# Patient Record
Sex: FEMALE | Race: WHITE
[De-identification: names, ages, dates, MRNs, and addresses within clinical notes are randomized per-mention and may not be internally consistent; named-entity substitution may affect disease eponyms.]

---

## 2016-05-06 VITALS
SYSTOLIC BLOOD PRESSURE: 165 MMHG | DIASTOLIC BLOOD PRESSURE: 72 MMHG | DIASTOLIC BLOOD PRESSURE: 72 MMHG | SYSTOLIC BLOOD PRESSURE: 165 MMHG | SYSTOLIC BLOOD PRESSURE: 165 MMHG | DIASTOLIC BLOOD PRESSURE: 72 MMHG | DIASTOLIC BLOOD PRESSURE: 72 MMHG | SYSTOLIC BLOOD PRESSURE: 165 MMHG

## 2017-07-28 ENCOUNTER — HOSPITAL ENCOUNTER (OUTPATIENT)
Dept: HOSPITAL 19 - ZLAB.WCH | Age: 67
End: 2017-07-28

## 2017-07-28 ENCOUNTER — HOSPITAL ENCOUNTER (OUTPATIENT)
Dept: HOSPITAL 6 - LAB | Age: 67
End: 2017-07-28
Payer: MEDICARE

## 2017-07-28 DIAGNOSIS — Z01.89: Primary | ICD-10-CM

## 2017-07-28 DIAGNOSIS — M25.512: Primary | ICD-10-CM

## 2017-09-28 ENCOUNTER — HOSPITAL ENCOUNTER (OUTPATIENT)
Dept: HOSPITAL 6 - MAMMO | Age: 67
End: 2017-09-28
Attending: FAMILY MEDICINE
Payer: MEDICARE

## 2017-09-28 DIAGNOSIS — Z12.31: Primary | ICD-10-CM

## 2017-09-28 PROCEDURE — G0202 SCR MAMMO BI INCL CAD: HCPCS

## 2017-10-23 ENCOUNTER — HOSPITAL ENCOUNTER (OUTPATIENT)
Dept: HOSPITAL 6 - MSO | Age: 67
End: 2017-10-23
Payer: MEDICARE

## 2017-10-23 DIAGNOSIS — R00.2: ICD-10-CM

## 2017-10-23 DIAGNOSIS — K44.9: ICD-10-CM

## 2017-10-23 DIAGNOSIS — K29.70: ICD-10-CM

## 2017-10-23 DIAGNOSIS — K21.9: ICD-10-CM

## 2017-10-23 DIAGNOSIS — I10: ICD-10-CM

## 2017-10-23 DIAGNOSIS — F41.9: ICD-10-CM

## 2017-10-23 DIAGNOSIS — K22.2: Primary | ICD-10-CM

## 2017-10-23 DIAGNOSIS — E05.00: ICD-10-CM

## 2017-10-23 DIAGNOSIS — F32.9: ICD-10-CM

## 2017-12-29 ENCOUNTER — HOSPITAL ENCOUNTER (OUTPATIENT)
Dept: HOSPITAL 6 - PT | Age: 67
Discharge: HOME | End: 2017-12-29
Attending: FAMILY MEDICINE
Payer: MEDICARE

## 2017-12-29 DIAGNOSIS — M25.511: Primary | ICD-10-CM

## 2018-06-26 ENCOUNTER — HOSPITAL ENCOUNTER (EMERGENCY)
Dept: HOSPITAL 6 - ED | Age: 68
Discharge: ANOTHER HOSPITAL | End: 2018-06-26
Payer: MEDICARE

## 2018-06-26 ENCOUNTER — HOSPITAL ENCOUNTER (INPATIENT)
Dept: HOSPITAL 19 - IMCU | Age: 68
LOS: 2 days | Discharge: HOME | DRG: 305 | End: 2018-06-28
Attending: INTERNAL MEDICINE | Admitting: INTERNAL MEDICINE
Payer: MEDICARE

## 2018-06-26 VITALS
SYSTOLIC BLOOD PRESSURE: 149 MMHG | SYSTOLIC BLOOD PRESSURE: 149 MMHG | SYSTOLIC BLOOD PRESSURE: 149 MMHG | SYSTOLIC BLOOD PRESSURE: 149 MMHG | DIASTOLIC BLOOD PRESSURE: 66 MMHG | DIASTOLIC BLOOD PRESSURE: 66 MMHG | DIASTOLIC BLOOD PRESSURE: 66 MMHG | DIASTOLIC BLOOD PRESSURE: 66 MMHG | SYSTOLIC BLOOD PRESSURE: 149 MMHG | DIASTOLIC BLOOD PRESSURE: 66 MMHG

## 2018-06-26 VITALS — OXYGEN SATURATION: 97 %

## 2018-06-26 VITALS — OXYGEN SATURATION: 99 %

## 2018-06-26 VITALS — OXYGEN SATURATION: 96 %

## 2018-06-26 VITALS — OXYGEN SATURATION: 93 %

## 2018-06-26 VITALS — OXYGEN SATURATION: 95 %

## 2018-06-26 VITALS — OXYGEN SATURATION: 98 %

## 2018-06-26 VITALS — OXYGEN SATURATION: 94 %

## 2018-06-26 VITALS — OXYGEN SATURATION: 100 %

## 2018-06-26 VITALS
DIASTOLIC BLOOD PRESSURE: 72 MMHG | TEMPERATURE: 97.1 F | HEART RATE: 71 BPM | OXYGEN SATURATION: 98 % | SYSTOLIC BLOOD PRESSURE: 159 MMHG

## 2018-06-26 VITALS — HEIGHT: 55 IN | WEIGHT: 178.35 LBS

## 2018-06-26 VITALS — OXYGEN SATURATION: 92 %

## 2018-06-26 VITALS — HEIGHT: 65.98 IN | BODY MASS INDEX: 26.68 KG/M2 | WEIGHT: 166.01 LBS

## 2018-06-26 VITALS
SYSTOLIC BLOOD PRESSURE: 171 MMHG | HEART RATE: 72 BPM | OXYGEN SATURATION: 95 % | TEMPERATURE: 96.8 F | DIASTOLIC BLOOD PRESSURE: 75 MMHG

## 2018-06-26 VITALS — OXYGEN SATURATION: 91 %

## 2018-06-26 VITALS — OXYGEN SATURATION: 90 %

## 2018-06-26 VITALS — OXYGEN SATURATION: 89 %

## 2018-06-26 DIAGNOSIS — R73.03: ICD-10-CM

## 2018-06-26 DIAGNOSIS — H81.93: ICD-10-CM

## 2018-06-26 DIAGNOSIS — I16.0: Primary | ICD-10-CM

## 2018-06-26 DIAGNOSIS — J42: ICD-10-CM

## 2018-06-26 DIAGNOSIS — R42: ICD-10-CM

## 2018-06-26 DIAGNOSIS — E87.6: ICD-10-CM

## 2018-06-26 DIAGNOSIS — I16.1: Primary | ICD-10-CM

## 2018-06-26 DIAGNOSIS — E78.5: ICD-10-CM

## 2018-06-26 LAB
ALBUMIN SERPL-MCNC: 4 G/DL (ref 3.5–5)
ALT SERPL-CCNC: 22 U/L (ref 9–52)
APPEARANCE UR: CLEAR
APTT PPP: 23.8 SECONDS (ref 21–32)
AST SERPL-CCNC: 36 U/L (ref 14–36)
BASOPHILS # BLD: 0 10*3/UL (ref 0.02–0.1)
BILIRUB SERPL-MCNC: 0.3 MG/DL (ref 0.2–1.3)
BILIRUB UR QL STRIP.AUTO: NEGATIVE
BUN/CREAT SERPL: 13.9 (ref 6–26)
CALCIUM SERPL-MCNC: 8.6 MG/DL (ref 8.4–10.2)
CO2 SERPL-SCNC: 27 MMOL/L (ref 22–30)
COLOR UR AUTO: YELLOW
EOSINOPHIL # BLD: 0.3 10*3/UL (ref 0.04–0.4)
EOSINOPHIL NFR BLD: 2.3 % (ref 1–5)
ERYTHROCYTE [DISTWIDTH] IN BLOOD BY AUTOMATED COUNT: 15.5 % (ref 11.5–14.5)
GLUCOSE SERPL-MCNC: 164 MG/DL (ref 65–105)
GLUCOSE UR QL STRIP.AUTO: (no result) MG/DL
HCT VFR BLD AUTO: 38.5 % (ref 37–47)
HGB BLD-MCNC: 11.8 G/DL (ref 12.5–16)
KETONES UR QL STRIP.AUTO: NEGATIVE
LEUKOCYTE ESTERASE UR QL STRIP: NEGATIVE
LYMPHOCYTES # BLD: 2.2 10*3/UL (ref 1.5–4)
MCH RBC QN AUTO: 26 PG (ref 27–31)
MCHC RBC AUTO-ENTMCNC: 31 G/DL (ref 33–37)
MCV RBC AUTO: 86 FL (ref 78–100)
MONOCYTES # BLD: 1.2 10*3/UL (ref 0.2–0.8)
NEUTROPHILS # BLD: 7.8 10*3/UL (ref 1.4–6.5)
NITRITE UR QL STRIP: NEGATIVE
PH UR STRIP.AUTO: 8 [PH] (ref 5–8)
PLATELET # BLD AUTO: 273 K/MM3 (ref 130–400)
PMV BLD AUTO: 10.5 FL (ref 7.4–10.4)
POTASSIUM SERPL-SCNC: 3.1 MMOL/L (ref 3.6–5)
PROT ?TM UR-MCNC: (no result) MG/DL
PROT SERPL-MCNC: 7.4 G/DL (ref 6.3–8.2)
PROTHROMBIN TIME: 10.3 SECONDS (ref 9–12)
RBC # BLD AUTO: 4.49 M/MM3 (ref 4.1–5.3)
RBC UR QL AUTO: NEGATIVE
SODIUM SERPL-SCNC: 139 MMOL/L (ref 137–145)
SP GR UR STRIP.AUTO: 1.01 (ref 1–1.03)
UROBILINOGEN UR-MCNC: NORMAL MG/DL
WBC # BLD AUTO: 11.5 K/MM3 (ref 4.8–10.8)
WBC #/AREA URNS HPF: (no result) /HPF (ref 0–3)

## 2018-06-26 PROCEDURE — A9585 GADOBUTROL INJECTION: HCPCS

## 2018-06-27 VITALS — OXYGEN SATURATION: 95 %

## 2018-06-27 VITALS — OXYGEN SATURATION: 96 %

## 2018-06-27 VITALS — OXYGEN SATURATION: 94 %

## 2018-06-27 VITALS
HEART RATE: 76 BPM | SYSTOLIC BLOOD PRESSURE: 165 MMHG | OXYGEN SATURATION: 94 % | DIASTOLIC BLOOD PRESSURE: 84 MMHG | TEMPERATURE: 98 F

## 2018-06-27 VITALS — OXYGEN SATURATION: 85 %

## 2018-06-27 VITALS — OXYGEN SATURATION: 93 %

## 2018-06-27 VITALS — OXYGEN SATURATION: 98 %

## 2018-06-27 VITALS — OXYGEN SATURATION: 97 %

## 2018-06-27 VITALS — OXYGEN SATURATION: 88 %

## 2018-06-27 VITALS — DIASTOLIC BLOOD PRESSURE: 85 MMHG | SYSTOLIC BLOOD PRESSURE: 136 MMHG | TEMPERATURE: 98 F | HEART RATE: 72 BPM

## 2018-06-27 VITALS — OXYGEN SATURATION: 90 %

## 2018-06-27 VITALS — OXYGEN SATURATION: 92 %

## 2018-06-27 VITALS — OXYGEN SATURATION: 99 %

## 2018-06-27 VITALS
DIASTOLIC BLOOD PRESSURE: 62 MMHG | OXYGEN SATURATION: 98 % | SYSTOLIC BLOOD PRESSURE: 144 MMHG | TEMPERATURE: 97 F | HEART RATE: 69 BPM

## 2018-06-27 VITALS — HEART RATE: 71 BPM | SYSTOLIC BLOOD PRESSURE: 166 MMHG | DIASTOLIC BLOOD PRESSURE: 75 MMHG

## 2018-06-27 VITALS — OXYGEN SATURATION: 100 %

## 2018-06-27 VITALS — TEMPERATURE: 97.2 F

## 2018-06-27 VITALS — SYSTOLIC BLOOD PRESSURE: 154 MMHG | DIASTOLIC BLOOD PRESSURE: 81 MMHG | TEMPERATURE: 97.5 F | HEART RATE: 60 BPM

## 2018-06-27 VITALS — SYSTOLIC BLOOD PRESSURE: 143 MMHG | HEART RATE: 66 BPM | DIASTOLIC BLOOD PRESSURE: 66 MMHG | OXYGEN SATURATION: 94 %

## 2018-06-27 VITALS — TEMPERATURE: 97 F | OXYGEN SATURATION: 97 %

## 2018-06-27 VITALS — OXYGEN SATURATION: 91 %

## 2018-06-27 VITALS — OXYGEN SATURATION: 87 %

## 2018-06-27 LAB
ANION GAP SERPL CALC-SCNC: 8 MMOL/L (ref 7–16)
BASOPHILS # BLD: 0 10*3/UL (ref 0–0.2)
BASOPHILS NFR BLD AUTO: 0.2 % (ref 0–2)
BUN SERPL-MCNC: 8 MG/DL (ref 7–17)
CALCIUM SERPL-MCNC: 8.8 MG/DL (ref 8.4–10.2)
CHLORIDE SERPL-SCNC: 101 MMOL/L (ref 98–107)
CO2 SERPL-SCNC: 30 MMOL/L (ref 22–30)
CREAT SERPL-SCNC: 0.76 MG/DL (ref 0.52–1.25)
EOSINOPHIL # BLD: 0.2 10*3/UL (ref 0–0.7)
EOSINOPHIL NFR BLD: 2.1 % (ref 0–4)
ERYTHROCYTE [DISTWIDTH] IN BLOOD BY AUTOMATED COUNT: 15.5 % (ref 11.5–14.5)
GLUCOSE SERPL-MCNC: 91 MG/DL (ref 74–106)
GRANULOCYTES # BLD AUTO: 56.9 % (ref 42.2–75.2)
HCT VFR BLD AUTO: 34.4 % (ref 37–47)
HGB BLD-MCNC: 10.7 G/DL (ref 12.5–16)
LYMPHOCYTES # BLD: 2.7 10*3/UL (ref 1.2–3.4)
LYMPHOCYTES NFR BLD: 30.7 % (ref 20–51)
MAGNESIUM SERPL-MCNC: 1.9 MG/DL (ref 1.6–2.3)
MCH RBC QN AUTO: 26 PG (ref 27–31)
MCHC RBC AUTO-ENTMCNC: 31 G/DL (ref 33–37)
MCV RBC AUTO: 85 FL (ref 80–100)
MONOCYTES # BLD: 0.9 10*3/UL (ref 0.1–0.6)
MONOCYTES NFR BLD AUTO: 9.8 % (ref 1.7–9.3)
NEUTROPHILS # BLD: 5 10*3/UL (ref 1.4–6.5)
PLATELET # BLD AUTO: 251 K/MM3 (ref 130–400)
PMV BLD AUTO: 10.4 FL (ref 7.4–10.4)
POTASSIUM SERPL-SCNC: 3.7 MMOL/L (ref 3.4–5)
RBC # BLD AUTO: 4.07 M/MM3 (ref 4.1–5.3)
SODIUM SERPL-SCNC: 139 MMOL/L (ref 137–145)
TSH SERPL DL<=0.005 MIU/L-ACNC: 1.47 UIU/ML (ref 0.47–4.68)

## 2018-06-28 VITALS — TEMPERATURE: 97.5 F | HEART RATE: 73 BPM | SYSTOLIC BLOOD PRESSURE: 151 MMHG | DIASTOLIC BLOOD PRESSURE: 67 MMHG

## 2018-06-28 VITALS — HEART RATE: 73 BPM | TEMPERATURE: 98.4 F | DIASTOLIC BLOOD PRESSURE: 61 MMHG | SYSTOLIC BLOOD PRESSURE: 114 MMHG

## 2018-06-28 VITALS — SYSTOLIC BLOOD PRESSURE: 179 MMHG | HEART RATE: 62 BPM | DIASTOLIC BLOOD PRESSURE: 77 MMHG | TEMPERATURE: 97.7 F

## 2018-06-28 VITALS — HEART RATE: 66 BPM | DIASTOLIC BLOOD PRESSURE: 75 MMHG | TEMPERATURE: 98 F | SYSTOLIC BLOOD PRESSURE: 144 MMHG

## 2018-06-28 LAB
ANION GAP SERPL CALC-SCNC: 13 MMOL/L (ref 7–16)
BUN SERPL-MCNC: 15 MG/DL (ref 7–17)
CALCIUM SERPL-MCNC: 9.2 MG/DL (ref 8.4–10.2)
CHLORIDE SERPL-SCNC: 98 MMOL/L (ref 98–107)
CO2 SERPL-SCNC: 28 MMOL/L (ref 22–30)
CREAT SERPL-SCNC: 0.88 MG/DL (ref 0.52–1.25)
ERYTHROCYTE [DISTWIDTH] IN BLOOD BY AUTOMATED COUNT: 15.9 % (ref 11.5–14.5)
GLUCOSE SERPL-MCNC: 99 MG/DL (ref 74–106)
HCT VFR BLD AUTO: 37.2 % (ref 37–47)
HGB BLD-MCNC: 11.5 G/DL (ref 12.5–16)
MCH RBC QN AUTO: 26 PG (ref 27–31)
MCHC RBC AUTO-ENTMCNC: 31 G/DL (ref 33–37)
MCV RBC AUTO: 85 FL (ref 80–100)
PLATELET # BLD AUTO: 271 K/MM3 (ref 130–400)
PMV BLD AUTO: 10.8 FL (ref 7.4–10.4)
POTASSIUM SERPL-SCNC: 3.3 MMOL/L (ref 3.4–5)
RBC # BLD AUTO: 4.4 M/MM3 (ref 4.1–5.3)
SODIUM SERPL-SCNC: 138 MMOL/L (ref 137–145)

## 2018-07-20 ENCOUNTER — HOSPITAL ENCOUNTER (OUTPATIENT)
Dept: HOSPITAL 6 - CARDREHAB | Age: 68
End: 2018-07-20
Attending: FAMILY MEDICINE
Payer: MEDICARE

## 2018-07-20 DIAGNOSIS — I49.3: Primary | ICD-10-CM

## 2018-07-20 DIAGNOSIS — Z82.49: ICD-10-CM

## 2018-07-20 PROCEDURE — A9500 TC99M SESTAMIBI: HCPCS

## 2018-08-10 ENCOUNTER — HOSPITAL ENCOUNTER (OUTPATIENT)
Dept: HOSPITAL 6 - PT | Age: 68
LOS: 52 days | Discharge: HOME | End: 2018-10-01
Attending: FAMILY MEDICINE
Payer: MEDICARE

## 2018-08-10 DIAGNOSIS — R42: Primary | ICD-10-CM

## 2018-10-02 ENCOUNTER — HOSPITAL ENCOUNTER (OUTPATIENT)
Dept: HOSPITAL 6 - MAMMO | Age: 68
End: 2018-10-02
Attending: FAMILY MEDICINE
Payer: MEDICARE

## 2018-10-02 DIAGNOSIS — Z12.31: Primary | ICD-10-CM

## 2019-02-12 ENCOUNTER — HOSPITAL ENCOUNTER (OUTPATIENT)
Dept: HOSPITAL 6 - RAD | Age: 69
End: 2019-02-12
Attending: FAMILY MEDICINE
Payer: MEDICARE

## 2019-02-12 DIAGNOSIS — M85.88: ICD-10-CM

## 2019-02-12 DIAGNOSIS — M85.851: ICD-10-CM

## 2019-02-12 DIAGNOSIS — M85.852: ICD-10-CM

## 2019-02-12 DIAGNOSIS — Z13.820: Primary | ICD-10-CM

## 2019-07-20 ENCOUNTER — HOSPITAL ENCOUNTER (EMERGENCY)
Dept: HOSPITAL 6 - ED | Age: 69
Discharge: HOME | End: 2019-07-20
Payer: MEDICARE

## 2019-07-20 VITALS — BODY MASS INDEX: 27.03 KG/M2 | HEIGHT: 67.99 IN | WEIGHT: 178.35 LBS

## 2019-07-20 VITALS
DIASTOLIC BLOOD PRESSURE: 65 MMHG | DIASTOLIC BLOOD PRESSURE: 65 MMHG | SYSTOLIC BLOOD PRESSURE: 134 MMHG | SYSTOLIC BLOOD PRESSURE: 134 MMHG | DIASTOLIC BLOOD PRESSURE: 65 MMHG | SYSTOLIC BLOOD PRESSURE: 134 MMHG | SYSTOLIC BLOOD PRESSURE: 134 MMHG | SYSTOLIC BLOOD PRESSURE: 134 MMHG | SYSTOLIC BLOOD PRESSURE: 134 MMHG | DIASTOLIC BLOOD PRESSURE: 65 MMHG | DIASTOLIC BLOOD PRESSURE: 65 MMHG | DIASTOLIC BLOOD PRESSURE: 65 MMHG | DIASTOLIC BLOOD PRESSURE: 65 MMHG | SYSTOLIC BLOOD PRESSURE: 134 MMHG

## 2019-07-20 DIAGNOSIS — E78.5: ICD-10-CM

## 2019-07-20 DIAGNOSIS — I10: ICD-10-CM

## 2019-07-20 DIAGNOSIS — E05.90: ICD-10-CM

## 2019-07-20 DIAGNOSIS — Z98.890: ICD-10-CM

## 2019-07-20 DIAGNOSIS — G45.9: Primary | ICD-10-CM

## 2019-07-20 LAB
ALBUMIN SERPL-MCNC: 4 G/DL (ref 3.4–4.8)
ALT SERPL-CCNC: 14 U/L (ref 0–55)
AST SERPL-CCNC: 20 U/L (ref 5–34)
BASOPHILS # BLD: 0 10*3/UL (ref 0.02–0.1)
BILIRUB SERPL-MCNC: 0.2 MG/DL (ref 0.2–1.2)
CALCIUM SERPL-MCNC: 9.3 MG/DL (ref 8.3–10.5)
CO2 SERPL-SCNC: 27 MMOL/L (ref 23–31)
EOSINOPHIL # BLD: 0.3 10*3/UL (ref 0.04–0.4)
EOSINOPHIL NFR BLD: 3.2 % (ref 1–5)
ERYTHROCYTE [DISTWIDTH] IN BLOOD BY AUTOMATED COUNT: 14.4 % (ref 11.5–14.5)
GLUCOSE SERPL-MCNC: 112 MG/DL (ref 65–105)
HCT VFR BLD AUTO: 38.3 % (ref 37–47)
HGB BLD-MCNC: 12 G/DL (ref 12.5–16)
LYMPHOCYTES # BLD: 2 10*3/UL (ref 1.5–4)
MCH RBC QN AUTO: 29 PG (ref 27–31)
MCHC RBC AUTO-ENTMCNC: 31 G/DL (ref 33–37)
MCV RBC AUTO: 91 FL (ref 78–100)
MONOCYTES # BLD: 0.9 10*3/UL (ref 0.2–0.8)
NEUTROPHILS # BLD: 5.9 10*3/UL (ref 1.4–6.5)
PLATELET # BLD AUTO: 235 K/MM3 (ref 130–400)
PMV BLD AUTO: 10.5 FL (ref 7.4–10.4)
POTASSIUM SERPL-SCNC: 4.5 MMOL/L (ref 3.5–5.1)
PROT SERPL-MCNC: 7 G/DL (ref 6.2–8.1)
RBC # BLD AUTO: 4.21 M/MM3 (ref 4.1–5.3)
SODIUM SERPL-SCNC: 140 MMOL/L (ref 136–145)
WBC # BLD AUTO: 9.1 K/MM3 (ref 4.8–10.8)

## 2019-08-08 ENCOUNTER — HOSPITAL ENCOUNTER (OUTPATIENT)
Dept: HOSPITAL 6 - RAD | Age: 69
End: 2019-08-08
Attending: FAMILY MEDICINE
Payer: MEDICARE

## 2019-08-08 DIAGNOSIS — I65.23: Primary | ICD-10-CM

## 2019-11-13 ENCOUNTER — HOSPITAL ENCOUNTER (OUTPATIENT)
Dept: HOSPITAL 6 - MAMMO | Age: 69
End: 2019-11-13
Attending: FAMILY MEDICINE
Payer: MEDICARE

## 2019-11-13 DIAGNOSIS — Z12.31: Primary | ICD-10-CM

## 2020-02-26 ENCOUNTER — HOSPITAL ENCOUNTER (OUTPATIENT)
Dept: HOSPITAL 6 - MSO | Age: 70
Discharge: HOME | End: 2020-02-26
Payer: MEDICARE

## 2020-02-26 ENCOUNTER — HOSPITAL ENCOUNTER (OUTPATIENT)
Dept: HOSPITAL 6 - LAB | Age: 70
End: 2020-02-26
Attending: FAMILY MEDICINE
Payer: MEDICARE

## 2020-02-26 DIAGNOSIS — Z79.899: ICD-10-CM

## 2020-02-26 DIAGNOSIS — E78.5: ICD-10-CM

## 2020-02-26 DIAGNOSIS — I10: ICD-10-CM

## 2020-02-26 DIAGNOSIS — E83.51: Primary | ICD-10-CM

## 2020-02-26 DIAGNOSIS — H25.812: Primary | ICD-10-CM

## 2020-02-26 DIAGNOSIS — Z86.73: ICD-10-CM

## 2020-02-26 DIAGNOSIS — Z79.02: ICD-10-CM

## 2020-02-26 DIAGNOSIS — D72.829: ICD-10-CM

## 2020-02-26 DIAGNOSIS — E05.90: ICD-10-CM

## 2020-02-26 DIAGNOSIS — E78.00: ICD-10-CM

## 2020-02-26 DIAGNOSIS — Z88.8: ICD-10-CM

## 2020-02-26 DIAGNOSIS — E05.00: ICD-10-CM

## 2020-02-26 LAB
ALBUMIN SERPL-MCNC: 4.3 G/DL (ref 3.4–4.8)
ALT SERPL-CCNC: 13 U/L (ref 0–55)
AST SERPL-CCNC: 19 U/L (ref 5–34)
BASOPHILS # BLD: 0 10*3/UL (ref 0.02–0.1)
BILIRUB SERPL-MCNC: 0.4 MG/DL (ref 0.2–1.2)
CALCIUM SERPL-MCNC: 9.4 MG/DL (ref 8.3–10.5)
CO2 SERPL-SCNC: 25 MMOL/L (ref 23–31)
EOSINOPHIL # BLD: 0.4 10*3/UL (ref 0.04–0.4)
EOSINOPHIL NFR BLD: 4.4 % (ref 1–5)
ERYTHROCYTE [DISTWIDTH] IN BLOOD BY AUTOMATED COUNT: 13.7 % (ref 11.5–14.5)
GLUCOSE SERPL-MCNC: 101 MG/DL (ref 65–105)
HCT VFR BLD AUTO: 40.5 % (ref 37–47)
HGB BLD-MCNC: 12.3 G/DL (ref 12.5–16)
LYMPHOCYTES # BLD: 2 10*3/UL (ref 1.5–4)
MCH RBC QN AUTO: 27 PG (ref 27–31)
MCHC RBC AUTO-ENTMCNC: 30 G/DL (ref 33–37)
MCV RBC AUTO: 89 FL (ref 78–100)
MONOCYTES # BLD: 0.8 10*3/UL (ref 0.2–0.8)
NEUTROPHILS # BLD: 4.7 10*3/UL (ref 1.4–6.5)
PLATELET # BLD AUTO: 244 K/MM3 (ref 130–400)
PMV BLD AUTO: 10.7 FL (ref 7.4–10.4)
POTASSIUM SERPL-SCNC: 3.5 MMOL/L (ref 3.5–5.1)
PROT SERPL-MCNC: 7.1 G/DL (ref 6.2–8.1)
RBC # BLD AUTO: 4.56 M/MM3 (ref 4.1–5.3)
SODIUM SERPL-SCNC: 140 MMOL/L (ref 136–145)
WBC # BLD AUTO: 7.9 K/MM3 (ref 4.8–10.8)

## 2020-02-26 PROCEDURE — V2632 POST CHMBR INTRAOCULAR LENS: HCPCS

## 2020-11-25 ENCOUNTER — HOSPITAL ENCOUNTER (OUTPATIENT)
Dept: HOSPITAL 6 - MAMMO | Age: 70
End: 2020-11-25
Attending: FAMILY MEDICINE
Payer: MEDICARE

## 2020-11-25 DIAGNOSIS — Z12.31: Primary | ICD-10-CM

## 2021-03-25 ENCOUNTER — HOSPITAL ENCOUNTER (OUTPATIENT)
Dept: HOSPITAL 6 - RAD | Age: 71
End: 2021-03-25
Attending: FAMILY MEDICINE
Payer: MEDICARE

## 2021-03-25 DIAGNOSIS — Z78.0: ICD-10-CM

## 2021-03-25 DIAGNOSIS — Z13.820: Primary | ICD-10-CM

## 2021-03-25 DIAGNOSIS — M85.80: ICD-10-CM

## 2021-12-08 ENCOUNTER — HOSPITAL ENCOUNTER (OUTPATIENT)
Dept: HOSPITAL 6 - MAMMO | Age: 71
End: 2021-12-08
Attending: FAMILY MEDICINE
Payer: MEDICARE

## 2021-12-08 DIAGNOSIS — Z12.31: Primary | ICD-10-CM

## 2023-03-28 ENCOUNTER — HOSPITAL ENCOUNTER (OUTPATIENT)
Dept: HOSPITAL 6 - RAD | Age: 73
End: 2023-03-28
Attending: FAMILY MEDICINE
Payer: MEDICARE

## 2023-03-28 ENCOUNTER — HOSPITAL ENCOUNTER (OUTPATIENT)
Dept: HOSPITAL 6 - MAMMO | Age: 73
End: 2023-03-28
Attending: FAMILY MEDICINE
Payer: MEDICARE

## 2023-03-28 DIAGNOSIS — Z12.31: Primary | ICD-10-CM

## 2023-03-28 DIAGNOSIS — M85.80: ICD-10-CM

## 2023-03-28 DIAGNOSIS — M85.89: ICD-10-CM

## 2024-10-09 ENCOUNTER — HOSPITAL ENCOUNTER (OUTPATIENT)
Dept: HOSPITAL 19 - SDCO | Age: 74
LOS: 2 days | Discharge: HOME | End: 2024-10-11
Attending: SURGERY
Payer: MEDICARE

## 2024-10-09 VITALS — DIASTOLIC BLOOD PRESSURE: 54 MMHG | HEART RATE: 75 BPM | SYSTOLIC BLOOD PRESSURE: 111 MMHG

## 2024-10-09 VITALS — SYSTOLIC BLOOD PRESSURE: 140 MMHG | TEMPERATURE: 98 F | DIASTOLIC BLOOD PRESSURE: 56 MMHG | HEART RATE: 90 BPM

## 2024-10-09 VITALS — HEART RATE: 77 BPM | DIASTOLIC BLOOD PRESSURE: 67 MMHG | TEMPERATURE: 98 F | SYSTOLIC BLOOD PRESSURE: 149 MMHG

## 2024-10-09 VITALS — SYSTOLIC BLOOD PRESSURE: 134 MMHG | HEART RATE: 88 BPM | DIASTOLIC BLOOD PRESSURE: 54 MMHG

## 2024-10-09 VITALS — TEMPERATURE: 98 F | HEART RATE: 84 BPM | DIASTOLIC BLOOD PRESSURE: 53 MMHG | SYSTOLIC BLOOD PRESSURE: 111 MMHG

## 2024-10-09 VITALS — HEART RATE: 71 BPM | SYSTOLIC BLOOD PRESSURE: 126 MMHG | TEMPERATURE: 97.5 F | DIASTOLIC BLOOD PRESSURE: 51 MMHG

## 2024-10-09 VITALS — TEMPERATURE: 97.6 F | HEART RATE: 76 BPM | SYSTOLIC BLOOD PRESSURE: 116 MMHG | DIASTOLIC BLOOD PRESSURE: 72 MMHG

## 2024-10-09 VITALS — DIASTOLIC BLOOD PRESSURE: 75 MMHG | SYSTOLIC BLOOD PRESSURE: 145 MMHG | HEART RATE: 82 BPM | TEMPERATURE: 97.9 F

## 2024-10-09 VITALS — SYSTOLIC BLOOD PRESSURE: 105 MMHG | DIASTOLIC BLOOD PRESSURE: 50 MMHG | TEMPERATURE: 97.7 F | HEART RATE: 80 BPM

## 2024-10-09 VITALS — DIASTOLIC BLOOD PRESSURE: 74 MMHG | TEMPERATURE: 97.9 F | HEART RATE: 78 BPM | SYSTOLIC BLOOD PRESSURE: 131 MMHG

## 2024-10-09 VITALS — SYSTOLIC BLOOD PRESSURE: 128 MMHG | DIASTOLIC BLOOD PRESSURE: 53 MMHG | TEMPERATURE: 98 F | HEART RATE: 83 BPM

## 2024-10-09 VITALS — SYSTOLIC BLOOD PRESSURE: 111 MMHG | DIASTOLIC BLOOD PRESSURE: 53 MMHG | HEART RATE: 73 BPM

## 2024-10-09 VITALS — SYSTOLIC BLOOD PRESSURE: 111 MMHG | TEMPERATURE: 98.4 F | DIASTOLIC BLOOD PRESSURE: 53 MMHG | HEART RATE: 73 BPM

## 2024-10-09 VITALS — SYSTOLIC BLOOD PRESSURE: 111 MMHG

## 2024-10-09 VITALS — SYSTOLIC BLOOD PRESSURE: 147 MMHG

## 2024-10-09 VITALS — HEIGHT: 65.98 IN | BODY MASS INDEX: 27.85 KG/M2 | WEIGHT: 173.28 LBS

## 2024-10-09 DIAGNOSIS — K21.9: Primary | ICD-10-CM

## 2024-10-09 DIAGNOSIS — K22.70: ICD-10-CM

## 2024-10-09 DIAGNOSIS — K44.9: ICD-10-CM

## 2024-10-09 PROCEDURE — A9284 NON-ELECTRONIC SPIROMETER: HCPCS

## 2024-10-09 NOTE — NUR
Patient awake, alert and oriented. Denies nausea, mild pain, denies needs for
medications at this time. Voided without issue. Tolerating small amounts of
clear liquids. On 1L NC, down to 88% when attempted to place on room air. Bed
in lowest position with call light within reach.

## 2024-10-09 NOTE — NUR
SW met with patient to complete initial assessment for discharge planning.
Patient confirmed that she lives alone in [place].  She lists her daughter
Carrie Cole ([phone]) as her emergency contact.  Patient denies having
a DPOA completed and declines to complete one at this time.  Patient sees Dr. Kamlesh Palacio as her PCP and uses [facility].  Patient denies
having any DME and states she is independent with all activities and drives
herself to appointments.  She states her daughter will drive her home at
discharge.
 
Discharge plan:  Home

## 2024-10-09 NOTE — NUR
PATIENT ARRIVED TO FLOOR AT 1015 FROM PACU. POST OP SITES STARTED. PATIENT HAS
6 INCISION SITES ON ABD ALL WITH SURGICAL GLUE, CDI. PATIENT EXPRESSES NO PIAN
AT THIS TIME JUST SLEEPY. DAUGHTER AT BEDSIDE. NO REQUEST AT THIS TIME. CALL
LIGHT IN REACH

## 2024-10-09 NOTE — NUR
pt is a&ox4 resting in bed. vss. meds given and assessment complete. pt
reports improved pain after pain medication given. assisted patient to
bathroom. x6 lap sites are cdi. pt tolerating clear liquid diet. INT to left
wrist patent.  scds to ble. pt denies needs at this time. call light in reach.

## 2024-10-10 VITALS — DIASTOLIC BLOOD PRESSURE: 77 MMHG | TEMPERATURE: 97.8 F | SYSTOLIC BLOOD PRESSURE: 151 MMHG | HEART RATE: 87 BPM

## 2024-10-10 VITALS — DIASTOLIC BLOOD PRESSURE: 76 MMHG | HEART RATE: 67 BPM | SYSTOLIC BLOOD PRESSURE: 153 MMHG | TEMPERATURE: 97.8 F

## 2024-10-10 VITALS — DIASTOLIC BLOOD PRESSURE: 76 MMHG | SYSTOLIC BLOOD PRESSURE: 138 MMHG | TEMPERATURE: 97.9 F | HEART RATE: 82 BPM

## 2024-10-10 VITALS — SYSTOLIC BLOOD PRESSURE: 169 MMHG | HEART RATE: 63 BPM | TEMPERATURE: 98.2 F | DIASTOLIC BLOOD PRESSURE: 75 MMHG

## 2024-10-10 VITALS — SYSTOLIC BLOOD PRESSURE: 135 MMHG | TEMPERATURE: 98 F | DIASTOLIC BLOOD PRESSURE: 66 MMHG | HEART RATE: 72 BPM

## 2024-10-10 VITALS — DIASTOLIC BLOOD PRESSURE: 77 MMHG | SYSTOLIC BLOOD PRESSURE: 179 MMHG

## 2024-10-10 VITALS — SYSTOLIC BLOOD PRESSURE: 135 MMHG

## 2024-10-10 VITALS — SYSTOLIC BLOOD PRESSURE: 131 MMHG

## 2024-10-10 VITALS — SYSTOLIC BLOOD PRESSURE: 151 MMHG

## 2024-10-10 VITALS — SYSTOLIC BLOOD PRESSURE: 153 MMHG

## 2024-10-10 VITALS — SYSTOLIC BLOOD PRESSURE: 179 MMHG

## 2024-10-10 NOTE — NUR
SHIFT ASSESSMENT COMPLETED. VSS. PATIENT RESTING IN BED W/ DAUGHTER AT
BEDSIDE. PATIENT REPORTS SHE WALKED TO THE BATHROOM A FEW TIMES LAST NIGHT BUT
HAS NOT DONE MUCH MOVING AROUND. HAS HAD SOME SOA WHEN AMBULATING AND STATES
SOME CHEST HEAVINESS AS WELL WHEN WALKING ONLY. REPORTED TO DR. CHAMBERLAIN AND HE
ADVISED PATIENT TO USE IS AND DO SOME DEEP BREATHING WILL CONTINUE TO MONITOR
THROUGHOUT TODAY. PATIENT REPORT NO PAIN AT TIME. PATIENT HAS NO OTHER REQUEST
AT THIS TIME. CALL LIGHT IN REACH

## 2024-10-10 NOTE — NUR
PATIENT HAS WALKED THE HALLS MULTIPLE TIMES THIS AM O2 SAT. 92-94% ON RM AIR
O2 NO LONGER REQUIRED, PATIENT STATES SHE HAS NOW PASSED GAS TWICE AND HAS
BEEN DOING HER DEEP BREATHING EACH HOUR. PATIENT STATES SOA IS IMPROVING. WILL
CONITINUE TO MONITOR.

## 2024-10-10 NOTE — NUR
Patient's IV leaking at this time, called Dr. Wyman and made him aware, he
said it's okay to leave the IV out at this time, received an order to change
the zofran to PO ODT prn.

## 2024-10-10 NOTE — NUR
Initial visit; Patient and her  thanked  for looking in on her
and offering God's blessings. Patient is very pleasant and says she is from
[place].

## 2024-10-10 NOTE — NUR
Patient's oxygen sat 88-90%, called the RT, patient on oxygen at 1LPM and
satting 92-93%, will continue to monitor.

## 2024-10-11 VITALS — SYSTOLIC BLOOD PRESSURE: 178 MMHG | DIASTOLIC BLOOD PRESSURE: 90 MMHG | HEART RATE: 68 BPM | TEMPERATURE: 98.5 F

## 2024-10-11 VITALS — DIASTOLIC BLOOD PRESSURE: 78 MMHG | HEART RATE: 65 BPM | TEMPERATURE: 98.1 F | SYSTOLIC BLOOD PRESSURE: 145 MMHG

## 2024-10-11 VITALS — SYSTOLIC BLOOD PRESSURE: 178 MMHG

## 2024-10-11 VITALS — SYSTOLIC BLOOD PRESSURE: 145 MMHG

## 2024-10-11 VITALS — SYSTOLIC BLOOD PRESSURE: 121 MMHG | TEMPERATURE: 97.7 F | HEART RATE: 68 BPM | DIASTOLIC BLOOD PRESSURE: 69 MMHG

## 2024-10-11 VITALS — DIASTOLIC BLOOD PRESSURE: 60 MMHG | SYSTOLIC BLOOD PRESSURE: 169 MMHG

## 2024-10-11 VITALS — SYSTOLIC BLOOD PRESSURE: 121 MMHG

## 2024-10-11 NOTE — NUR
SHIFT ASSESSMENT COMPLETED. VSS. PATIENT SLEEPING ON ARRIVAL. PATIENT IS
REQUIRING O2 AT 1L WHEN SLEEPING ONLY, WHEN AWAKE STATS AT 94% ON RM AIR. ALL
MORNING MEDS GIVEN AS ORDERED. PATIENT HAS ORDERED BREAKFAST AND STATES SHE
FEELS READY TO GO HOME TODAY. NO REQUEST AT THIS TIME. CALL LIGHT IN REACH